# Patient Record
Sex: FEMALE | Race: WHITE | NOT HISPANIC OR LATINO | Employment: PART TIME | URBAN - METROPOLITAN AREA
[De-identification: names, ages, dates, MRNs, and addresses within clinical notes are randomized per-mention and may not be internally consistent; named-entity substitution may affect disease eponyms.]

---

## 2024-03-06 ENCOUNTER — TELEPHONE (OUTPATIENT)
Dept: ONCOLOGY | Facility: CLINIC | Age: 49
End: 2024-03-06
Payer: COMMERCIAL

## 2024-03-06 NOTE — TELEPHONE ENCOUNTER
Caller: Carmen Aldridge    Relationship: Self    Best call back number: [phone]     Who are you requesting to speak with (clinical staff, provider,  specific staff member): SCHEDULING    What was the call regarding: PATIENT UNDERSTOOD THAT SHE WAS SCHEDULED FOR TODAY.    APPT APPEARS TO HAVE BEEN CANCELLED PER PATIENT AND RESCHEDULED FOR 4/30/24    PATIENT STATES THAT SHE DID NOT CANCEL THE APPT NOR WAS SHE INFORMED OF APPT CHANGE.     PATIENT REQUESTED TO SPEAK TO OFFICE. PATIENT WAS PLACE ON HOLD WITH [facility] BEFORE AND ENCOUNTER WAS REQUESTED.    PATIENT REQUESTING A RETURN CALL FROM

## 2024-03-06 NOTE — TELEPHONE ENCOUNTER
Spoke with patient and she states her appt for 3/6 was cancelled and rescheduled. Patient states she was not notified of appt. Pt was very upset about her appt being moved. States she is missing work and she will have to take off work again. I offered pt a sooner appt and pt accepted that appt at [facility]. Gave pt date time an location. Patient states that she would speak with MD about this. Advised her that she is scheduled with a different provider.

## 2024-03-08 ENCOUNTER — TELEPHONE (OUTPATIENT)
Dept: ONCOLOGY | Facility: CLINIC | Age: 49
End: 2024-03-08

## 2024-03-08 NOTE — TELEPHONE ENCOUNTER
Caller: Carmen Aldridge    Relationship to patient: Self    Best call back number: [phone]     Chief complaint: FIX LAB LOCATION    Type of visit: NEW PATIENT LAB    Requested date: SAME DAY AND TIME     If rescheduling, when is the original appointment: 3/27 AT 9:50- NEEDS LAB CHANGED TO [facility] TO MATCH DR CELESTE SIL[facility] APPT AT [facility].

## 2024-03-27 ENCOUNTER — LAB (OUTPATIENT)
Dept: OTHER | Facility: HOSPITAL | Age: 49
End: 2024-03-27
Payer: COMMERCIAL

## 2024-03-27 ENCOUNTER — CONSULT (OUTPATIENT)
Dept: ONCOLOGY | Facility: CLINIC | Age: 49
End: 2024-03-27
Payer: COMMERCIAL

## 2024-03-27 VITALS
BODY MASS INDEX: 28.53 KG/M2 | SYSTOLIC BLOOD PRESSURE: 130 MMHG | TEMPERATURE: 97.3 F | HEART RATE: 60 BPM | OXYGEN SATURATION: 97 % | DIASTOLIC BLOOD PRESSURE: 82 MMHG | HEIGHT: 64 IN | RESPIRATION RATE: 16 BRPM | WEIGHT: 167.1 LBS

## 2024-03-27 DIAGNOSIS — R23.3 EASY BRUISING: Primary | ICD-10-CM

## 2024-03-27 DIAGNOSIS — Z87.898 HISTORY OF BRUISING EASILY: Primary | ICD-10-CM

## 2024-03-27 DIAGNOSIS — M19.90 JOINT INFLAMMATION: ICD-10-CM

## 2024-03-27 LAB
BASOPHILS # BLD AUTO: 0.05 10*3/MM3 (ref 0–0.2)
BASOPHILS NFR BLD AUTO: 0.7 % (ref 0–1.5)
DEPRECATED RDW RBC AUTO: 42.8 FL (ref 37–54)
EOSINOPHIL # BLD AUTO: 0.17 10*3/MM3 (ref 0–0.4)
EOSINOPHIL NFR BLD AUTO: 2.4 % (ref 0.3–6.2)
ERYTHROCYTE [DISTWIDTH] IN BLOOD BY AUTOMATED COUNT: 12.9 % (ref 12.3–15.4)
HCT VFR BLD AUTO: 41.7 % (ref 34–46.6)
HGB BLD-MCNC: 14.1 G/DL (ref 12–15.9)
IMM GRANULOCYTES # BLD AUTO: 0.03 10*3/MM3 (ref 0–0.05)
IMM GRANULOCYTES NFR BLD AUTO: 0.4 % (ref 0–0.5)
LYMPHOCYTES # BLD AUTO: 1.58 10*3/MM3 (ref 0.7–3.1)
LYMPHOCYTES NFR BLD AUTO: 22.2 % (ref 19.6–45.3)
MCH RBC QN AUTO: 30.8 PG (ref 26.6–33)
MCHC RBC AUTO-ENTMCNC: 33.8 G/DL (ref 31.5–35.7)
MCV RBC AUTO: 91 FL (ref 79–97)
MONOCYTES # BLD AUTO: 0.46 10*3/MM3 (ref 0.1–0.9)
MONOCYTES NFR BLD AUTO: 6.5 % (ref 5–12)
NEUTROPHILS NFR BLD AUTO: 4.83 10*3/MM3 (ref 1.7–7)
NEUTROPHILS NFR BLD AUTO: 67.8 % (ref 42.7–76)
NRBC BLD AUTO-RTO: 0 /100 WBC (ref 0–0.2)
PLATELET # BLD AUTO: 226 10*3/MM3 (ref 140–450)
PMV BLD AUTO: 10.5 FL (ref 6–12)
RBC # BLD AUTO: 4.58 10*6/MM3 (ref 3.77–5.28)
WBC NRBC COR # BLD AUTO: 7.12 10*3/MM3 (ref 3.4–10.8)

## 2024-03-27 PROCEDURE — 85025 COMPLETE CBC W/AUTO DIFF WBC: CPT | Performed by: INTERNAL MEDICINE

## 2024-03-27 PROCEDURE — 36415 COLL VENOUS BLD VENIPUNCTURE: CPT

## 2024-03-27 NOTE — PROGRESS NOTES
Subjective     REASON FOR CONSULTATION:  Provide an opinion on any further workup or treatment on:    Easy bruising                       REQUESTING PHYSICIAN: Flavio Brito PA    RECORDS OBTAINED: Records of the patients history including those obtained from the referring provider were reviewed and summarized in detail.    HISTORY OF PRESENT ILLNESS:    Carmen Aldridge is a 48 y.o. patient who was referred for evaluation of easy bruising.  She reports that she developed easy bruising in her early 20s.  She had significant bruising from boxes bumping against her legs at work.  She did not have problem with bleeding from the nose or mouth.  She had a lithotripsy in 2022 and did not have problem with bleeding afterwards.  No abnormal bleeding with her deliveries.    Patient's son has recurrent nosebleeds and was seen by ENT and required cauterization.  He was found to have significant problems with allergies and he is currently on allergy shots.    Patient reports problem with her joints specially the cervical spine.  She received steroid injections.  She also received steroid injections to other joints.  She has eczema and has needed steroids treatment systemically when she had flareups.    Patient reports that she had significant exposure to sun in her life.     REVIEW OF SYSTEMS:  Review of Systems   Constitutional:  Positive for unexpected weight change. Negative for fatigue and fever.        Night sweats   HENT:  Negative for nosebleeds and voice change.    Eyes:  Negative for visual disturbance.   Respiratory:  Negative for cough and shortness of breath.    Cardiovascular:  Positive for chest pain. Negative for leg swelling.   Gastrointestinal:  Negative for abdominal pain, blood in stool, constipation, diarrhea, nausea and vomiting.   Genitourinary:  Negative for frequency and hematuria.   Musculoskeletal:  Positive for arthralgias. Negative for back pain and joint swelling.   Skin:  Negative for rash.  "       Itching   Neurological:  Negative for dizziness and headaches.   Hematological:  Negative for adenopathy. Does not bruise/bleed easily.   Psychiatric/Behavioral:  Negative for dysphoric mood. The patient is not nervous/anxious.         Past Medical History:   Diagnosis Date    Anxiety     Interstitial cystitis     Osteoarthritis      No past surgical history on file.    Social History     Socioeconomic History    Marital status: Single   Tobacco Use    Smokeless tobacco: Former     Quit date: 1/1/2021     No family history on file.     MEDICATIONS:    Current Outpatient Medications:     Calcium Carb-Cholecalciferol (CALCIUM 600-D PO), Take 600 mg by mouth Daily. D 3, Disp: , Rfl:     Cyanocobalamin (VITAMIN B 12 PO), Take 1,000 mcg by mouth Daily., Disp: , Rfl:     Misc Natural Products (OSTEO BI-FLEX ADV TRIPLE ST PO), Take  by mouth Daily., Disp: , Rfl:      ALLERGIES:  No Known Allergies     Objective   VITAL SIGNS:  Vitals:    03/27/24 1036   BP: 130/82   Pulse: 60   Resp: 16   Temp: 97.3 °F (36.3 °C)   TempSrc: Temporal   SpO2: 97%   Weight: 75.8 kg (167 lb 1.6 oz)   Height: 163 cm (64.17\")  Comment: new ht   PainSc: 0-No pain     Wt Readings from Last 3 Encounters:   03/27/24 75.8 kg (167 lb 1.6 oz)     PHYSICAL EXAMINATION  GENERAL:  The patient appears in good general condition, not in acute distress.  SKIN: No skin rashes.  Small ecchymosis over forearms and hands.  HEAD:  Normocephalic.  EYES:  No Jaundice. No Pallor. Pupils equal. EOMI.  NECK:  Supple with Good ROM.    CHEST: Normal respiratory effort.   CARDIAC:  No edema.  ABDOMEN: Nondistended.  EXTREMITIES:  No clubbing. No noted deformity.   NEUROLOGICAL:  No Focal neurological deficits.     RESULT REVIEW:   Results from last 7 days   Lab Units 03/27/24  1031   WBC 10*3/mm3 7.12   NEUTROS ABS 10*3/mm3 4.83   HEMOGLOBIN g/dL 14.1   HEMATOCRIT % 41.7   PLATELETS 10*3/mm3 226     Labs on 1/23/2024:   Creatinine 0.83.  Calcium 9.6.  Globulin " 2.6.  Total bilirubin <0.2.  AST 22.  ALT 17.  Alkaline phosphatase 103.  B12 1253.  Folate 11.5.  TSH 2.16.  Vitamin D 40.2.     Assessment & Plan   *Easy bruising.  Symptoms started when she was in her 20s.  She had excessive bruising from boxes bumping into her legs while at work.  The only surgical procedure she had was lithotripsy in March 2022.  She did not have problems with bleeding.  No history of epistaxis or bleeding from the mouth.  Family history is positive for recurrent epistaxis and her son who has significant allergies.  He is on allergy shots.  Exam today revealed a few small ecchymosis.  Platelet count is normal.  Her picture her is more likely indicative of a local problem in the skin (skin thinning due to chronic steroid therapy and due to sun exposure) rather than an underlying bleeding disorder.  I recommended further evaluation for bleeding disorder given her son's history of recurrent epistaxis.    *Joint inflammation.  Patient has pain in the cervical spine.  She received steroid injections.  She did not benefit from Tylenol.  She takes NSAIDs, usually ibuprofen, frequently.  I explained that NSAIDs can result in easy bruising due to aspirin like effect.  In addition, NSAIDs can alter the results of platelet function analysis.  She has been taking cinnamon to decrease the inflammation and this can also alter the results of platelet function analysis..    PLAN:    1.  In 2-3 weeks, we will schedule patient to return to our lab for platelet function analysis, pro time, PTT, fibrinogen, von Willebrand factor antigen activity and factor VIII activity.  2.  I asked the patient to avoid NSAIDs or cinnamon for 1 week before the lab test.  She was given the okay to take Tylenol for her arthritis pain while she is off NSAIDs.  3.  We will see her in follow-up 1-2 weeks after the labs to review the results.  4.  I asked her to take pictures of areas of bruises if she develops bruises before her next  visit.      José Antonio Gaston MD  03/27/24

## 2024-03-27 NOTE — LETTER
March 27, 2024       No Recipients    Patient: Carmen Aldridge   YOB: 1975   Date of Visit: 3/27/2024     Dear NAVDEEP Lee:       Thank you for referring Carmen Aldridge to me for evaluation. Below are the relevant portions of my assessment and plan of care.    If you have questions, please do not hesitate to call me. I look forward to following Carmen along with you.         Sincerely,        José Antonio Gaston MD        CC:   No Recipients    José Antonio Gaston MD  03/27/24 1807  Sign when Signing Visit  Subjective     REASON FOR CONSULTATION:  Provide an opinion on any further workup or treatment on:    Easy bruising                       REQUESTING PHYSICIAN: Flavio Brito PA    RECORDS OBTAINED: Records of the patients history including those obtained from the referring provider were reviewed and summarized in detail.    HISTORY OF PRESENT ILLNESS:    Carmen Aldridge is a 48 y.o. patient who was referred for evaluation of easy bruising.  She reports that she developed easy bruising in her early 20s.  She had significant bruising from boxes bumping against her legs at work.  She did not have problem with bleeding from the nose or mouth.  She had a lithotripsy in 2022 and did not have problem with bleeding afterwards.  No abnormal bleeding with her deliveries.    Patient's son has recurrent nosebleeds and was seen by ENT and required cauterization.  He was found to have significant problems with allergies and he is currently on allergy shots.    Patient reports problem with her joints specially the cervical spine.  She received steroid injections.  She also received steroid injections to other joints.  She has eczema and has needed steroids treatment systemically when she had flareups.    Patient reports that she had significant exposure to sun in her life.     REVIEW OF SYSTEMS:  Review of Systems   Constitutional:  Positive for unexpected weight change. Negative for fatigue  "and fever.        Night sweats   HENT:  Negative for nosebleeds and voice change.    Eyes:  Negative for visual disturbance.   Respiratory:  Negative for cough and shortness of breath.    Cardiovascular:  Positive for chest pain. Negative for leg swelling.   Gastrointestinal:  Negative for abdominal pain, blood in stool, constipation, diarrhea, nausea and vomiting.   Genitourinary:  Negative for frequency and hematuria.   Musculoskeletal:  Positive for arthralgias. Negative for back pain and joint swelling.   Skin:  Negative for rash.        Itching   Neurological:  Negative for dizziness and headaches.   Hematological:  Negative for adenopathy. Does not bruise/bleed easily.   Psychiatric/Behavioral:  Negative for dysphoric mood. The patient is not nervous/anxious.         Past Medical History:   Diagnosis Date   • Anxiety    • Interstitial cystitis    • Osteoarthritis      No past surgical history on file.    Social History     Socioeconomic History   • Marital status: Single   Tobacco Use   • Smokeless tobacco: Former     Quit date: 1/1/2021     No family history on file.     MEDICATIONS:    Current Outpatient Medications:   •  Calcium Carb-Cholecalciferol (CALCIUM 600-D PO), Take 600 mg by mouth Daily. D 3, Disp: , Rfl:   •  Cyanocobalamin (VITAMIN B 12 PO), Take 1,000 mcg by mouth Daily., Disp: , Rfl:   •  Misc Natural Products (OSTEO BI-FLEX ADV TRIPLE ST PO), Take  by mouth Daily., Disp: , Rfl:      ALLERGIES:  No Known Allergies     Objective   VITAL SIGNS:  Vitals:    03/27/24 1036   BP: 130/82   Pulse: 60   Resp: 16   Temp: 97.3 °F (36.3 °C)   TempSrc: Temporal   SpO2: 97%   Weight: 75.8 kg (167 lb 1.6 oz)   Height: 163 cm (64.17\")  Comment: new ht   PainSc: 0-No pain     Wt Readings from Last 3 Encounters:   03/27/24 75.8 kg (167 lb 1.6 oz)     PHYSICAL EXAMINATION  GENERAL:  The patient appears in good general condition, not in acute distress.  SKIN: No skin rashes.  Small ecchymosis over forearms and " hands.  HEAD:  Normocephalic.  EYES:  No Jaundice. No Pallor. Pupils equal. EOMI.  NECK:  Supple with Good ROM.    CHEST: Normal respiratory effort.   CARDIAC:  No edema.  ABDOMEN: Nondistended.  EXTREMITIES:  No clubbing. No noted deformity.   NEUROLOGICAL:  No Focal neurological deficits.     RESULT REVIEW:   Results from last 7 days   Lab Units 03/27/24  1031   WBC 10*3/mm3 7.12   NEUTROS ABS 10*3/mm3 4.83   HEMOGLOBIN g/dL 14.1   HEMATOCRIT % 41.7   PLATELETS 10*3/mm3 226     Labs on 1/23/2024:   Creatinine 0.83.  Calcium 9.6.  Globulin 2.6.  Total bilirubin <0.2.  AST 22.  ALT 17.  Alkaline phosphatase 103.  B12 1253.  Folate 11.5.  TSH 2.16.  Vitamin D 40.2.     Assessment & Plan   *Easy bruising.  Symptoms started when she was in her 20s.  She had excessive bruising from boxes bumping into her legs while at work.  The only surgical procedure she had was lithotripsy in March 2022.  She did not have problems with bleeding.  No history of epistaxis or bleeding from the mouth.  Family history is positive for recurrent epistaxis and her son who has significant allergies.  He is on allergy shots.  Exam today revealed a few small ecchymosis.  Platelet count is normal.  Her picture her is more likely indicative of a local problem in the skin (skin thinning due to chronic steroid therapy and due to sun exposure) rather than an underlying bleeding disorder.  I recommended further evaluation for bleeding disorder given her son's history of recurrent epistaxis.    *Joint inflammation.  Patient has pain in the cervical spine.  She received steroid injections.  She did not benefit from Tylenol.  She takes NSAIDs, usually ibuprofen, frequently.  I explained that NSAIDs can result in easy bruising due to aspirin like effect.  In addition, NSAIDs can alter the results of platelet function analysis.  She has been taking cinnamon to decrease the inflammation and this can also alter the results of platelet function  analysis..    PLAN:    1.  In 2-3 weeks, we will schedule patient to return to our lab for platelet function analysis, pro time, PTT, fibrinogen, von Willebrand factor antigen activity and factor VIII activity.  2.  I asked the patient to avoid NSAIDs or cinnamon for 1 week before the lab test.  She was given the okay to take Tylenol for her arthritis pain while she is off NSAIDs.  3.  We will see her in follow-up 1-2 weeks after the labs to review the results.  4.  I asked her to take pictures of areas of bruises if she develops bruises before her next visit.      José Antonio Gaston MD  03/27/24

## 2024-04-16 ENCOUNTER — TELEPHONE (OUTPATIENT)
Dept: ONCOLOGY | Facility: CLINIC | Age: 49
End: 2024-04-16
Payer: COMMERCIAL

## 2024-04-16 NOTE — TELEPHONE ENCOUNTER
Caller: Carmen Aldridge    Relationship: Self    Best call back number: [phone]    What is the best time to reach you: ANYTIME    Who are you requesting to speak with (clinical staff, provider, specific staff member): SCHEDULING    What was the call regarding: PLEASE CALL PATIENT TO R/S HER 4/23 LAB APPT

## 2024-06-17 ENCOUNTER — LAB (OUTPATIENT)
Dept: LAB | Facility: HOSPITAL | Age: 49
End: 2024-06-17
Payer: COMMERCIAL

## 2024-06-17 DIAGNOSIS — R23.3 EASY BRUISING: ICD-10-CM

## 2024-06-17 DIAGNOSIS — M19.90 JOINT INFLAMMATION: ICD-10-CM

## 2024-06-17 LAB
APTT PPP: 28.6 SECONDS (ref 22.7–35.4)
CLOSURE TME COLL+ADP BLD: 149 SECONDS (ref 52–122)
CLOSURE TME COLL+EPINEP BLD: 109 SECONDS (ref 68–148)
FIBRINOGEN PPP-MCNC: 471 MG/DL (ref 219–464)
INR PPP: 1.03 (ref 0.9–1.1)
PROTHROMBIN TIME: 13.7 SECONDS (ref 11.7–14.2)

## 2024-06-17 PROCEDURE — 85384 FIBRINOGEN ACTIVITY: CPT | Performed by: INTERNAL MEDICINE

## 2024-06-17 PROCEDURE — 85610 PROTHROMBIN TIME: CPT | Performed by: INTERNAL MEDICINE

## 2024-06-17 PROCEDURE — 36415 COLL VENOUS BLD VENIPUNCTURE: CPT

## 2024-06-17 PROCEDURE — 85576 BLOOD PLATELET AGGREGATION: CPT | Performed by: INTERNAL MEDICINE

## 2024-06-17 PROCEDURE — 85730 THROMBOPLASTIN TIME PARTIAL: CPT | Performed by: INTERNAL MEDICINE

## 2024-06-17 PROCEDURE — 85240 CLOT FACTOR VIII AHG 1 STAGE: CPT | Performed by: INTERNAL MEDICINE

## 2024-06-18 LAB
FACT VIII ACT/NOR PPP: 119 % ACTIVITY (ref 60–150)
Lab: NORMAL

## 2024-06-19 LAB
VWF AG ACT/NOR PPP IA: 125 % (ref 50–200)
VWF:RCO ACT/NOR PPP PL AGG: 93 % (ref 50–200)

## 2024-07-01 ENCOUNTER — OFFICE VISIT (OUTPATIENT)
Dept: ONCOLOGY | Facility: CLINIC | Age: 49
End: 2024-07-01
Payer: COMMERCIAL

## 2024-07-01 ENCOUNTER — APPOINTMENT (OUTPATIENT)
Dept: LAB | Facility: HOSPITAL | Age: 49
End: 2024-07-01
Payer: COMMERCIAL

## 2024-07-01 VITALS
HEIGHT: 64 IN | BODY MASS INDEX: 27.96 KG/M2 | WEIGHT: 163.8 LBS | RESPIRATION RATE: 16 BRPM | DIASTOLIC BLOOD PRESSURE: 71 MMHG | OXYGEN SATURATION: 97 % | TEMPERATURE: 98.1 F | HEART RATE: 69 BPM | SYSTOLIC BLOOD PRESSURE: 109 MMHG

## 2024-07-01 DIAGNOSIS — R23.3 EASY BRUISING: Primary | ICD-10-CM

## 2024-07-01 DIAGNOSIS — R79.89 HIGH SERUM FIBRINOGEN: ICD-10-CM

## 2024-07-01 LAB
CLOSURE TME COLL+ADP BLD: 123 SECONDS (ref 52–122)
CLOSURE TME COLL+EPINEP BLD: 120 SECONDS (ref 68–148)
FIBRINOGEN PPP-MCNC: 429 MG/DL (ref 219–464)

## 2024-07-01 PROCEDURE — 99214 OFFICE O/P EST MOD 30 MIN: CPT | Performed by: INTERNAL MEDICINE

## 2024-07-01 PROCEDURE — 36415 COLL VENOUS BLD VENIPUNCTURE: CPT | Performed by: INTERNAL MEDICINE

## 2024-07-01 PROCEDURE — 85576 BLOOD PLATELET AGGREGATION: CPT | Performed by: INTERNAL MEDICINE

## 2024-07-01 PROCEDURE — 85384 FIBRINOGEN ACTIVITY: CPT | Performed by: INTERNAL MEDICINE

## 2024-07-01 NOTE — PROGRESS NOTES
"Subjective     CHIEF COMPLAINT:      Chief Complaint   Patient presents with    Follow-up     HISTORY OF PRESENT ILLNESS:     Carmen Aldridge is a 48 y.o. female patient who returns today for follow up on her easy bruising.  She returns today for follow-up reporting continued problems with easy bruising.  She has eczema and has frequent flareups.  The lesions occasionally become very itchy.    Patient states that she is no longer taking ibuprofen.  She is taking acetaminophen for pain.    ROS:  Pertinent ROS is in the HPI.     Past medical, surgical, social and family history were reviewed.     MEDICATIONS:    Current Outpatient Medications:     Calcium Carb-Cholecalciferol (CALCIUM 600-D PO), Take 600 mg by mouth Daily. D 3, Disp: , Rfl:     Cyanocobalamin (VITAMIN B 12 PO), Take 1,000 mcg by mouth Daily., Disp: , Rfl:     Misc Natural Products (OSTEO BI-FLEX ADV TRIPLE ST PO), Take  by mouth Daily., Disp: , Rfl:   Objective     VITAL SIGNS:     Vitals:    07/01/24 1457   BP: 109/71   Pulse: 69   Resp: 16   Temp: 98.1 °F (36.7 °C)   TempSrc: Oral   SpO2: 97%   Weight: 74.3 kg (163 lb 12.8 oz)   Height: 163 cm (64.17\")   PainSc: 0-No pain     Body mass index is 27.97 kg/m².     Wt Readings from Last 5 Encounters:   07/01/24 74.3 kg (163 lb 12.8 oz)   03/27/24 75.8 kg (167 lb 1.6 oz)     PHYSICAL EXAMINATION:   GENERAL: The patient appears in good general condition, not in acute distress.   SKIN: Small areas of ecchymosis over the forearms.  EYES: No jaundice. No Pallor.  CHEST: Normal respiratory effort.   CVS: No edema.  ABDOMEN: Nondistended.  EXTREMITIES: No joint deformity.     DIAGNOSTIC DATA:     Component      Latest Ref Rng 6/17/2024 7/1/2024   Collagen/Epinephrine      68 - 148 Seconds 109  120    Collagen/ADP      52 - 122 Seconds 149 (H)  123 (H)       Component      Latest Ref Rng 6/17/2024 7/1/2024   Fibrinogen      219 - 464 mg/dL 471 (H)  429       Component      Latest Ref Rng 6/17/2024   Factor VIII " Activity      60 - 150 % Activity 119        Protime      11.7 - 14.2 Seconds 13.7    INR      0.90 - 1.10  1.03    VWF Activity      50 - 200 % 93    Von Willebrand Ag      50 - 200 % 125    PTT      22.7 - 35.4 seconds 28.6      Assessment & Plan    *Easy bruising.  Symptoms started when she was in her 20s.  She had excessive bruising from boxes bumping into her legs while at work.  The only surgical procedure she had was lithotripsy in March 2022.  She did not have problems with bleeding.  No history of epistaxis or bleeding from the mouth.  Family history is positive for recurrent epistaxis and her son who has significant allergies.  He is on allergy shots.  Exam on 3/27/2024 revealed a few small ecchymosis.  Platelet count normal at 226,000.  Platelet function analysis on 6/17/2024 revealed prolonged collagen/ADP time of 149 seconds.  Collagen epinephrine time was 109 seconds.  No evidence of von Willebrand disease (von Willebrand factor activity was 93%).  PT and PTT were normal.  7/1/2024: Platelet function analysis was repeated.  Collagen epinephrine times remains normal at 120 seconds.  Collagen ADP time improved to 123 seconds (minimal increase above the normal range of 122 seconds.  Based on her repeat platelet function analysis from today, no evidence of underlying bleeding disorder.  The easy bruising is attributed to thinning of the skin due to prolonged sun exposure and longstanding steroid therapy for eczema.    *Elevated fibrinogen level.  6/17/2024: Fibrinogen was 471.  7/1/2024, fibrinogen was repeated and was 429-normal.  I explained to the patient that the elevated fibrinogen was attributed to inflammation (eczema) and pneumonia she had in May 2024.     PLAN:    1.  Labs from today do not show evidence of underlying bleeding disorder.  Therefore, the easy bruising is attributed to skin thinning due to prolonged exposure to sun and steroid therapy.  2.  Due to the easy bruising, I recommended  avoiding NSAIDs.  I recommended taking acetaminophen (Tylenol) as needed for pain.  3.  Patient is cleared to have the lithotripsy.  4.  I did not schedule her for a follow-up visit.  We will be available to see her in the future if the need arises.      José Antonio Gaston MD  07/01/24

## 2024-07-02 ENCOUNTER — TELEPHONE (OUTPATIENT)
Dept: ONCOLOGY | Facility: CLINIC | Age: 49
End: 2024-07-02
Payer: COMMERCIAL

## 2024-07-02 NOTE — TELEPHONE ENCOUNTER
----- Message from José Antonio Gaston sent at 7/1/2024  6:29 PM EDT -----  Please inform the patient that fibrinogen level became normal.  It was most likely elevated due to the effect of pneumonia she had in April/May.    Platelet function analysis test improved.  One measurement remains normal.  The second improved and is only 1 second above the normal range.     Therefore, the lab tests do not show evidence of a bleeding disorder.    Thank you